# Patient Record
(demographics unavailable — no encounter records)

---

## 2025-05-01 NOTE — PHYSICAL EXAM
[2+] : posterior tibialis pulse: 2+ [Normal] : saphenous nerve sensation normal [4___] : eversion 4[unfilled]/5 [] : non-antalgic [Right] : right ankle [Weight -] : weightbearing [There are no fractures, subluxations or dislocations. No significant abnormalities are seen] : There are no fractures, subluxations or dislocations. No significant abnormalities are seen [FreeTextEntry9] :  AP, mortise and lateral xrays of the ankle were taken and reviewed today. [[id number]] : dorsiflexion 10 degrees [de-identified] : plantar flexion 30 degrees [de-identified] : inversion 20 degrees [de-identified] : eversion 15 degrees

## 2025-05-01 NOTE — HISTORY OF PRESENT ILLNESS
[de-identified] : Pt. is a 22 year old female who presents for evaluation of her RT ankle. Reports twisting injury late April 2025. No formal treatment to date. WB without assistive device. No previous injury/problem with RT ankle.